# Patient Record
Sex: FEMALE | Race: WHITE | NOT HISPANIC OR LATINO | Employment: FULL TIME | URBAN - METROPOLITAN AREA
[De-identification: names, ages, dates, MRNs, and addresses within clinical notes are randomized per-mention and may not be internally consistent; named-entity substitution may affect disease eponyms.]

---

## 2019-03-05 ENCOUNTER — OFFICE VISIT (OUTPATIENT)
Dept: OBSTETRICS AND GYNECOLOGY | Age: 24
End: 2019-03-05

## 2019-03-05 VITALS
WEIGHT: 129.2 LBS | BODY MASS INDEX: 20.28 KG/M2 | DIASTOLIC BLOOD PRESSURE: 72 MMHG | HEIGHT: 67 IN | SYSTOLIC BLOOD PRESSURE: 118 MMHG

## 2019-03-05 DIAGNOSIS — N89.8 VAGINAL DISCHARGE: ICD-10-CM

## 2019-03-05 DIAGNOSIS — Z01.419 WELL WOMAN EXAM WITH ROUTINE GYNECOLOGICAL EXAM: Primary | ICD-10-CM

## 2019-03-05 PROCEDURE — 99385 PREV VISIT NEW AGE 18-39: CPT | Performed by: NURSE PRACTITIONER

## 2019-03-05 NOTE — PROGRESS NOTES
[facility]  Routine Annual Visit    3/5/2019    Patient: Manjula Puente          MR#:[id number]      History of Present Illness    23 y.o. female  who presents for annual exam as a new patient. Recently moved here from [place] and would like to establish care. She reports she has received GYN care previously and reports a possible normal pap. Manjula reports menses onset at age 16. She reports normal, monthly cycles about every 28 days lasting 5-7 days with moderate bleeding and cramping. She was using nuvaring for contraception but discontinued about a year ago. She and her long-term boyfriend are not preventing pregnancy. Recommend prenatal vitamins. She is not taking any medications, and denies any medical hx. She voices no complaints today.      Patient's last menstrual period was 2019 (exact date).  Obstetric History:  OB History      Para Term  AB Living    0 0 0 0 0 0    SAB TAB Ectopic Molar Multiple Live Births    0 0 0 0 0 0         Menstrual History:  Menarche Age: 16 years  Patient's last menstrual period was 2019 (exact date).  Period Cycle (Days): 30  Period Duration (Days): 5 days  Dysmenorrhea: (!) Moderate  Dysmenorrhea Symptoms: Cramping    Sexual History:       ________________________________________  There is no problem list on file for this patient.      History reviewed. No pertinent past medical history.    History reviewed. No pertinent surgical history.    Social History     Tobacco Use   Smoking Status Never Smoker   Smokeless Tobacco Never Used       Family History   Problem Relation Age of Onset   • No Known Problems Mother    • No Known Problems Father    • Hypertension Maternal Grandmother        Prior to Admission medications    Not on File     ________________________________________      The following portions of the patient's history were reviewed and updated as appropriate: allergies, current medications, past family  "history, past medical history, past social history, past surgical history and problem list.    Review of Systems   Constitutional: Negative.    HENT: Negative.    Eyes: Negative for visual disturbance.   Respiratory: Negative for cough, shortness of breath and wheezing.    Cardiovascular: Negative for chest pain, palpitations and leg swelling.   Gastrointestinal: Negative for abdominal distention, abdominal pain, blood in stool, constipation, diarrhea, nausea and vomiting.   Endocrine: Negative for cold intolerance and heat intolerance.   Genitourinary: Negative for difficulty urinating, dyspareunia, dysuria, frequency, genital sores, hematuria, menstrual problem, pelvic pain, urgency, vaginal bleeding, vaginal discharge and vaginal pain.   Musculoskeletal: Negative.    Skin: Negative.    Neurological: Negative for dizziness, weakness, light-headedness, numbness and headaches.   Hematological: Negative.    Psychiatric/Behavioral: Negative.    Breasts: negative for lumps skin changes, dimpling, swelling, nipple changes/discharge bilaterally         Objective   Physical Exam    /72   Ht 170.2 cm (67\")   Wt 58.6 kg (129 lb 3.2 oz)   LMP 03/05/2019 (Exact Date)   Breastfeeding? No   BMI 20.24 kg/m²    BP Readings from Last 3 Encounters:   03/05/19 118/72      Wt Readings from Last 3 Encounters:   03/05/19 58.6 kg (129 lb 3.2 oz)        BMI: Estimated body mass index is 20.24 kg/m² as calculated from the following:    Height as of this encounter: 170.2 cm (67\").    Weight as of this encounter: 58.6 kg (129 lb 3.2 oz).        General:   alert, appears stated age and cooperative   Heart: regular rate and rhythm, S1, S2 normal, no murmur, click, rub or gallop   Lungs: clear to auscultation bilaterally   Abdomen: soft, non-tender, without masses or organomegaly   Breast: inspection negative, no nipple discharge or bleeding, no masses or nodularity palpable   Vulva: normal   Vagina: normal mucosa, thin grey " discharge, scant blood   Cervix: no cervical motion tenderness and no lesions   Uterus: normal size, mobile, non-tender or normal shape and consistency   Adnexa: no mass, fullness, tenderness     Assessment:    1. Normal annual exam   2. Contraception- declines. Recommend prenatal vitamins  3. Counseled on healthy lifestyle modifications, safe sex, condom use, and self breast exams.      Plan:    []  Rx:   []  Mammogram request made  [x]  PAP done  []  Occult fecal blood test ([facility])  []  Labs:   [x]  GC/Chl/TV  []  DEXA scan   []  Referral for colonoscopy:           Jessy Torres, ALEXANDER  3/5/2019 1:42 PM

## 2019-03-07 LAB
A VAGINAE DNA VAG QL NAA+PROBE: ABNORMAL SCORE
BVAB2 DNA VAG QL NAA+PROBE: ABNORMAL SCORE
C ALBICANS DNA VAG QL NAA+PROBE: NEGATIVE
C GLABRATA DNA VAG QL NAA+PROBE: NEGATIVE
C TRACH RRNA SPEC QL NAA+PROBE: NEGATIVE
CONV .: NORMAL
CYTOLOGIST CVX/VAG CYTO: NORMAL
CYTOLOGY CVX/VAG DOC THIN PREP: NORMAL
DX ICD CODE: NORMAL
HIV 1 & 2 AB SER-IMP: NORMAL
MEGA1 DNA VAG QL NAA+PROBE: ABNORMAL SCORE
N GONORRHOEA RRNA SPEC QL NAA+PROBE: NEGATIVE
OTHER STN SPEC: NORMAL
PATH REPORT.FINAL DX SPEC: NORMAL
STAT OF ADQ CVX/VAG CYTO-IMP: NORMAL
T VAGINALIS RRNA SPEC QL NAA+PROBE: NEGATIVE

## 2019-03-08 RX ORDER — METRONIDAZOLE 500 MG/1
500 TABLET ORAL 2 TIMES DAILY
Qty: 14 TABLET | Refills: 0 | Status: SHIPPED | OUTPATIENT
Start: 2019-03-08 | End: 2019-03-15

## 2019-03-11 ENCOUNTER — TELEPHONE (OUTPATIENT)
Dept: OBSTETRICS AND GYNECOLOGY | Age: 24
End: 2019-03-11

## 2019-03-11 NOTE — TELEPHONE ENCOUNTER
----- Message from ALEXANDER Spencer sent at 3/8/2019  9:54 AM EST -----  Please inform patient her pap smear returned negative (normal). Also, please let pt know bacterial vaginosis returned positive on her routine vaginal culture. This is not an STD but a common vaginal infection. Antibiotic sent to pharmacy. Advise pt to avoid alcohol during therapy. Thank you.

## 2019-08-19 ENCOUNTER — OFFICE VISIT (OUTPATIENT)
Dept: OBSTETRICS AND GYNECOLOGY | Age: 24
End: 2019-08-19

## 2019-08-19 VITALS
HEIGHT: 67 IN | SYSTOLIC BLOOD PRESSURE: 104 MMHG | BODY MASS INDEX: 20.72 KG/M2 | WEIGHT: 132 LBS | DIASTOLIC BLOOD PRESSURE: 62 MMHG

## 2019-08-19 DIAGNOSIS — N89.8 VAGINAL DISCHARGE: Primary | ICD-10-CM

## 2019-08-19 PROCEDURE — 99213 OFFICE O/P EST LOW 20 MIN: CPT | Performed by: NURSE PRACTITIONER

## 2019-08-19 NOTE — PROGRESS NOTES
Subjective   Manjula Puente is a 23 y.o. female.     History of Present Illness     Manjula presents with complaint of intermittent vaginal discharge and odor x several weeks. Hx BV treated in March- pt feels as if symptoms are similar. Denies new partner or exposure to STI. No other complaints today.     The following portions of the patient's history were reviewed and updated as appropriate: allergies, current medications, past family history, past medical history, past social history, past surgical history and problem list.    Review of Systems   Constitutional: Negative for chills, fatigue and fever.   Gastrointestinal: Negative for abdominal distention and abdominal pain.   Genitourinary: Positive for vaginal discharge. Negative for difficulty urinating, dyspareunia, dysuria, frequency, genital sores, menstrual problem, pelvic pain, pelvic pressure, urgency, urinary incontinence, vaginal bleeding and vaginal pain.     Objective   Physical Exam    External genitalia WNL- negative for lesions, swelling, and skin discolorations  Vagina WNL- clear, no lesions  Cervix WNL- no lesions, discharge, or CMT  Uterus NSS,  freely mobile, nontender  Adnexa WNL- no masses or tenderness      Assessment/Plan   Manjula was seen today for follow-up.    Diagnoses and all orders for this visit:    Vaginal discharge  -     NuSwab VG+ - Swab, Vagina      Await nuswab and treat accordingly.    Jessy Torres, APRN

## 2019-08-22 ENCOUNTER — TELEPHONE (OUTPATIENT)
Dept: OBSTETRICS AND GYNECOLOGY | Age: 24
End: 2019-08-22

## 2019-08-22 LAB
A VAGINAE DNA VAG QL NAA+PROBE: ABNORMAL SCORE
BVAB2 DNA VAG QL NAA+PROBE: ABNORMAL SCORE
C ALBICANS DNA VAG QL NAA+PROBE: NEGATIVE
C GLABRATA DNA VAG QL NAA+PROBE: NEGATIVE
C TRACH RRNA SPEC QL NAA+PROBE: NEGATIVE
MEGA1 DNA VAG QL NAA+PROBE: ABNORMAL SCORE
N GONORRHOEA RRNA SPEC QL NAA+PROBE: NEGATIVE
T VAGINALIS RRNA SPEC QL NAA+PROBE: NEGATIVE

## 2019-08-22 RX ORDER — METRONIDAZOLE 500 MG/1
500 TABLET ORAL 2 TIMES DAILY
Qty: 14 TABLET | Refills: 0 | Status: SHIPPED | OUTPATIENT
Start: 2019-08-22 | End: 2019-08-29

## 2019-08-22 NOTE — TELEPHONE ENCOUNTER
----- Message from ALEXANDER Spencer sent at 8/22/2019 12:57 PM EDT -----  Please notify patient bacterial vaginosis returned positive as we suspected. All other infections negative. I have sent flagyl to her pharmacy. Remind avoid alcohol during therapy.

## 2020-04-28 ENCOUNTER — OFFICE VISIT (OUTPATIENT)
Dept: OBSTETRICS AND GYNECOLOGY | Age: 25
End: 2020-04-28

## 2020-04-28 DIAGNOSIS — N89.8 VAGINAL DISCHARGE: ICD-10-CM

## 2020-04-28 DIAGNOSIS — R39.15 URINARY URGENCY: Primary | ICD-10-CM

## 2020-04-28 PROCEDURE — 99213 OFFICE O/P EST LOW 20 MIN: CPT | Performed by: NURSE PRACTITIONER

## 2020-04-28 RX ORDER — NITROFURANTOIN 25; 75 MG/1; MG/1
100 CAPSULE ORAL 2 TIMES DAILY
Qty: 14 CAPSULE | Refills: 0 | Status: SHIPPED | OUTPATIENT
Start: 2020-04-28 | End: 2020-05-05

## 2020-04-28 RX ORDER — METRONIDAZOLE 7.5 MG/G
GEL VAGINAL NIGHTLY
Qty: 70 G | Refills: 0 | Status: SHIPPED | OUTPATIENT
Start: 2020-04-28 | End: 2020-05-03

## 2020-04-28 NOTE — PROGRESS NOTES
"Subjective   Manjula Puente is a 24 y.o. female.     History of Present Illness     You have chosen to receive care through a telephone visit. Do you consent to use a telephone visit for your medical care today? Yes    Manjula presents via telephone visit with several complaints.     \"I'm pretty sure I have a UTI.\" Manjula complains of dysuria, frequency, and urgency since yesterday. She reports past hx of UTIs but has not been treated recently. She denies backache, fevers/chills, nausea    She also complains of recurrent BV infections. She has several nuswabs on file +BV. Her symptoms are increase in discharge with associated odor. Last nuswab in August. No new partners or concern for STI.    The following portions of the patient's history were reviewed and updated as appropriate: allergies, current medications, past family history, past medical history, past social history, past surgical history and problem list.    Review of Systems   Constitutional: Negative for chills, fatigue and fever.   Gastrointestinal: Negative for abdominal distention, abdominal pain and nausea.   Genitourinary: Positive for dysuria, frequency, pelvic pressure, urgency and vaginal discharge. Negative for decreased urine volume, difficulty urinating, dyspareunia, flank pain, genital sores, hematuria, menstrual problem, pelvic pain, urinary incontinence, vaginal bleeding and vaginal pain.       Objective   Physical Exam      Assessment/Plan   Diagnoses and all orders for this visit:    Urinary urgency    Vaginal discharge    Other orders  -     nitrofurantoin, macrocrystal-monohydrate, (Macrobid) 100 MG capsule; Take 1 capsule by mouth 2 (Two) Times a Day for 7 days.  -     metroNIDAZOLE (METROGEL VAGINAL) 0.75 % vaginal gel; Insert  into the vagina Every Night for 5 days.      Manjula will begin macrobid therapy today. She is advised to call if symptoms do not improve or worsen over the next 24 hours - further evaluation and urine " culture would be indicated.    metrogel sent to pharmacy for BV symptoms. Will begin next week after macrobid therapy. Advised no alcohol. Probiotics also recommended.    She is due for annual exam and plans to schedule this appointment    Duration of evisit 12 minutes.    ALEXANDER Ga

## 2020-05-05 ENCOUNTER — TELEPHONE (OUTPATIENT)
Dept: OBSTETRICS AND GYNECOLOGY | Age: 25
End: 2020-05-05

## 2020-05-05 RX ORDER — FLUCONAZOLE 150 MG/1
150 TABLET ORAL ONCE
Qty: 1 TABLET | Refills: 0 | Status: SHIPPED | OUTPATIENT
Start: 2020-05-05 | End: 2020-05-05

## 2020-05-05 NOTE — TELEPHONE ENCOUNTER
Pt called stating she believes the macrobid has given her a yeast infection- now having vaginal itching. Diflucan sent to pharmacy. Advised if symptoms do not resolve I recommend in office visit this week. She agrees.

## 2020-05-28 ENCOUNTER — TELEPHONE (OUTPATIENT)
Dept: OBSTETRICS AND GYNECOLOGY | Age: 25
End: 2020-05-28

## 2020-05-28 RX ORDER — FLUCONAZOLE 150 MG/1
150 TABLET ORAL ONCE
Qty: 2 TABLET | Refills: 0 | Status: SHIPPED | OUTPATIENT
Start: 2020-05-28 | End: 2020-05-28

## 2020-05-28 NOTE — TELEPHONE ENCOUNTER
Pt of Jessy Torres called in thinks she may have a possible yeast infections.  Symptoms are: itching, discharge and discomfort.  No fever.  Symptoms just started.  Please advise.

## 2020-08-05 ENCOUNTER — OFFICE VISIT (OUTPATIENT)
Dept: OBSTETRICS AND GYNECOLOGY | Age: 25
End: 2020-08-05

## 2020-08-05 VITALS
WEIGHT: 136 LBS | DIASTOLIC BLOOD PRESSURE: 64 MMHG | HEIGHT: 67 IN | BODY MASS INDEX: 21.35 KG/M2 | SYSTOLIC BLOOD PRESSURE: 106 MMHG

## 2020-08-05 DIAGNOSIS — Z01.419 WELL WOMAN EXAM WITH ROUTINE GYNECOLOGICAL EXAM: Primary | ICD-10-CM

## 2020-08-05 PROCEDURE — 99395 PREV VISIT EST AGE 18-39: CPT | Performed by: NURSE PRACTITIONER

## 2020-08-05 NOTE — PROGRESS NOTES
[facility], [place], operated by [facility]  Routine Annual Visit    2020    Patient: Manjula Puente          MR#:[id number]      History of Present Illness    24 y.o. female  who presents for annual exam.     Last pap negative 3/2019  Manjula has struggled this past year with frequent BV infections- resolves with flagyl treatment and has had no problems since April.  She denies new medical hx  Declines contraception again this year. Recommend prenatal vitamin  Denies new partners  Reports regular, monthly cycles    Manjula has no complaints today  Considering moving back home to [facility] this fall.     Patient's last menstrual period was 2020 (exact date).  Obstetric History:  OB History        0    Para   0    Term   0       0    AB   0    Living   0       SAB   0    TAB   0    Ectopic   0    Molar   0    Multiple   0    Live Births   0               Menstrual History:     Patient's last menstrual period was 2020 (exact date).       Sexual History:       ________________________________________  There is no problem list on file for this patient.      History reviewed. No pertinent past medical history.    Past Surgical History:   Procedure Laterality Date   • WISDOM TOOTH EXTRACTION         Social History     Tobacco Use   Smoking Status Never Smoker   Smokeless Tobacco Never Used       Family History   Problem Relation Age of Onset   • No Known Problems Mother    • No Known Problems Father    • Hypertension Maternal Grandmother        Prior to Admission medications    Not on File     ________________________________________    The following portions of the patient's history were reviewed and updated as appropriate: allergies, current medications, past family history, past medical history, past social history, past surgical history and problem list.    Review of Systems   Constitutional: Negative.    HENT: Negative.    Eyes: Negative for visual disturbance.   Respiratory: Negative for  "cough, shortness of breath and wheezing.    Cardiovascular: Negative for chest pain, palpitations and leg swelling.   Gastrointestinal: Negative for abdominal distention, abdominal pain, blood in stool, constipation, diarrhea, nausea and vomiting.   Endocrine: Negative for cold intolerance and heat intolerance.   Genitourinary: Negative for difficulty urinating, dyspareunia, dysuria, frequency, genital sores, hematuria, menstrual problem, pelvic pain, urgency, vaginal bleeding, vaginal discharge and vaginal pain.   Musculoskeletal: Negative.    Skin: Negative.    Neurological: Negative for dizziness, weakness, light-headedness, numbness and headaches.   Hematological: Negative.    Psychiatric/Behavioral: Negative.    Breasts: negative for lumps skin changes, dimpling, swelling, nipple changes/discharge bilaterally         Objective   Physical Exam    /64   Ht 170.2 cm (67\")   Wt 61.7 kg (136 lb)   LMP 07/12/2020 (Exact Date)   BMI 21.30 kg/m²    BP Readings from Last 3 Encounters:   08/05/20 106/64   08/19/19 104/62   03/05/19 118/72      Wt Readings from Last 3 Encounters:   08/05/20 61.7 kg (136 lb)   08/19/19 59.9 kg (132 lb)   03/05/19 58.6 kg (129 lb 3.2 oz)        BMI: Estimated body mass index is 21.3 kg/m² as calculated from the following:    Height as of this encounter: 170.2 cm (67\").    Weight as of this encounter: 61.7 kg (136 lb).            General:   alert, appears stated age and cooperative   Heart: regular rate and rhythm, S1, S2 normal, no murmur, click, rub or gallop   Lungs: clear to auscultation bilaterally   Abdomen: soft, non-tender, without masses or organomegaly   Breast: inspection negative, no nipple discharge or bleeding, no masses or nodularity palpable   Vulva: normal   Vagina: normal mucosa, normal discharge   Cervix: no cervical motion tenderness and no lesions   Uterus: normal size, mobile, non-tender or normal shape and consistency   Adnexa: no mass, fullness, tenderness "     Assessment:    1. Normal annual exam   2.  Counseled on healthy lifestyle modifications, safe sex, condom use, and self breast exams.    All of the patient's questions were addressed and answered, I have encouraged her to call for today's test results if she has not received them within 10 days.  Patient is advised to call with any change in her condition or with any other questions, otherwise return in 12 months for annual examination.      Plan:    []  Rx:   []  Mammogram request made  [x]  PAP done  []  Occult fecal blood test ([facility])  []  Labs:   [x]  GC/Chl/TV  []  DEXA scan   []  Referral for colonoscopy:           Jessy Torres, ALEXANDER  8/5/2020 11:00

## 2020-08-07 LAB
C TRACH RRNA CVX QL NAA+PROBE: NEGATIVE
CONV .: NORMAL
CYTOLOGIST CVX/VAG CYTO: NORMAL
CYTOLOGY CVX/VAG DOC CYTO: NORMAL
CYTOLOGY CVX/VAG DOC THIN PREP: NORMAL
DX ICD CODE: NORMAL
HIV 1 & 2 AB SER-IMP: NORMAL
N GONORRHOEA RRNA CVX QL NAA+PROBE: NEGATIVE
OTHER STN SPEC: NORMAL
STAT OF ADQ CVX/VAG CYTO-IMP: NORMAL
T VAGINALIS RRNA SPEC QL NAA+PROBE: NEGATIVE

## 2020-08-10 ENCOUNTER — TELEPHONE (OUTPATIENT)
Dept: OBSTETRICS AND GYNECOLOGY | Age: 25
End: 2020-08-10

## 2020-08-10 NOTE — TELEPHONE ENCOUNTER
----- Message from ALEXANDER Spencer sent at 8/10/2020  8:29 AM EDT -----  Please inform patient her pap smear returned negative (normal). Thank you.

## 2020-10-02 ENCOUNTER — TELEPHONE (OUTPATIENT)
Dept: OBSTETRICS AND GYNECOLOGY | Age: 25
End: 2020-10-02

## 2020-10-02 RX ORDER — FLUCONAZOLE 150 MG/1
150 TABLET ORAL DAILY
Qty: 1 TABLET | Refills: 0 | Status: SHIPPED | OUTPATIENT
Start: 2020-10-02 | End: 2020-10-03

## 2020-10-02 NOTE — TELEPHONE ENCOUNTER
Patient called and states she has a yeast infection.  Would like Diflucan called into pharmacy.  Please advise.